# Patient Record
Sex: MALE
[De-identification: names, ages, dates, MRNs, and addresses within clinical notes are randomized per-mention and may not be internally consistent; named-entity substitution may affect disease eponyms.]

---

## 2017-07-12 ENCOUNTER — CHARTING TRANS (OUTPATIENT)
Dept: OTHER | Age: 37
End: 2017-07-12

## 2017-07-12 ASSESSMENT — PAIN SCALES - GENERAL: PAINLEVEL_OUTOF10: 0

## 2017-08-22 ENCOUNTER — BH HISTORICAL (OUTPATIENT)
Dept: OTHER | Age: 37
End: 2017-08-22

## 2017-09-26 ENCOUNTER — BH HISTORICAL (OUTPATIENT)
Dept: OTHER | Age: 37
End: 2017-09-26

## 2017-11-09 ENCOUNTER — BH HISTORICAL (OUTPATIENT)
Dept: OTHER | Age: 37
End: 2017-11-09

## 2017-12-18 ENCOUNTER — LAB SERVICES (OUTPATIENT)
Dept: OTHER | Age: 37
End: 2017-12-18

## 2017-12-19 ENCOUNTER — CHARTING TRANS (OUTPATIENT)
Dept: OTHER | Age: 37
End: 2017-12-19

## 2017-12-19 LAB
ALBUMIN SERPL-MCNC: 4.2 G/DL (ref 3.6–5.1)
ALBUMIN/GLOB SERPL: 1.4 (ref 1–2.4)
ALP SERPL-CCNC: 72 UNITS/L (ref 45–117)
ALT SERPL-CCNC: 35 UNITS/L
ANION GAP SERPL CALC-SCNC: 14 MMOL/L (ref 10–20)
AST SERPL-CCNC: 22 UNITS/L
BILIRUB SERPL-MCNC: 0.5 MG/DL (ref 0.2–1)
BUN SERPL-MCNC: 19 MG/DL (ref 6–20)
BUN/CREAT SERPL: 16 (ref 7–25)
CALCIUM SERPL-MCNC: 9.1 MG/DL (ref 8.4–10.2)
CHLORIDE SERPL-SCNC: 99 MMOL/L (ref 98–107)
CHOLEST SERPL-MCNC: 154 MG/DL
CHOLEST/HDLC SERPL: 2.7
CO2 SERPL-SCNC: 30 MMOL/L (ref 21–32)
CREAT SERPL-MCNC: 1.17 MG/DL (ref 0.67–1.17)
GLOBULIN SER-MCNC: 3.1 G/DL (ref 2–4)
GLUCOSE SERPL-MCNC: 86 MG/DL (ref 65–99)
HDLC SERPL-MCNC: 57 MG/DL
LDLC SERPL CALC-MCNC: 75 MG/DL
LENGTH OF FAST TIME PATIENT: NORMAL HRS
LENGTH OF FAST TIME PATIENT: NORMAL HRS
NONHDLC SERPL-MCNC: 97 MG/DL
POTASSIUM SERPL-SCNC: 4.2 MMOL/L (ref 3.4–5.1)
SODIUM SERPL-SCNC: 139 MMOL/L (ref 135–145)
TOTAL PROTEIN: 7.3 G/DL (ref 6.4–8.2)
TRIGL SERPL-MCNC: 110 MG/DL

## 2018-02-26 ENCOUNTER — BH HISTORICAL (OUTPATIENT)
Dept: OTHER | Age: 38
End: 2018-02-26

## 2018-06-06 ENCOUNTER — TELEPHONE (OUTPATIENT)
Dept: INTERNAL MEDICINE CLINIC | Facility: CLINIC | Age: 38
End: 2018-06-06

## 2018-06-06 ENCOUNTER — HOSPITAL ENCOUNTER (OUTPATIENT)
Dept: MRI IMAGING | Facility: HOSPITAL | Age: 38
Discharge: HOME | End: 2018-06-06
Attending: ORTHOPAEDIC SURGERY
Payer: COMMERCIAL

## 2018-06-06 DIAGNOSIS — S46.222A: ICD-10-CM

## 2018-06-06 DIAGNOSIS — S46.211A RUPTURE OF RIGHT BICEPS TENDON, INITIAL ENCOUNTER: Primary | ICD-10-CM

## 2018-06-06 PROCEDURE — 73221 MRI JOINT UPR EXTREM W/O DYE: CPT | Performed by: ORTHOPAEDIC SURGERY

## 2018-06-06 NOTE — TELEPHONE ENCOUNTER
Called patient and relayed DR. NGUYEN message - verbalized understanding . Transferred to [facility] to schedule nancy.  Tomorrow to establish care and pre op visit

## 2018-06-06 NOTE — TELEPHONE ENCOUNTER
To nursing, please tell patient HMO referral entered for Dr. Richelle John. Pt needs to make an appt for a visit to establish care here--and see someone here as pre-op tomorrow. I am not in the office tomorrow (Thursday) but he can see an associate.    I ha

## 2018-06-06 NOTE — TELEPHONE ENCOUNTER
Pt. Called stating he is having an emergent situation regarding a distal tendon rupture on his right arm. Dr. Beau Johnson at [facility] can do his surgery this Friday. The pt.  Has [facility] and will need a referral from Dr. Mago Holman

## 2018-06-07 ENCOUNTER — OFFICE VISIT (OUTPATIENT)
Dept: INTERNAL MEDICINE CLINIC | Facility: CLINIC | Age: 38
End: 2018-06-07

## 2018-06-07 ENCOUNTER — TELEPHONE (OUTPATIENT)
Dept: INTERNAL MEDICINE CLINIC | Facility: CLINIC | Age: 38
End: 2018-06-07

## 2018-06-07 VITALS
BODY MASS INDEX: 26.18 KG/M2 | WEIGHT: 204 LBS | DIASTOLIC BLOOD PRESSURE: 64 MMHG | TEMPERATURE: 100 F | OXYGEN SATURATION: 98 % | SYSTOLIC BLOOD PRESSURE: 126 MMHG | RESPIRATION RATE: 18 BRPM | HEART RATE: 88 BPM | HEIGHT: 74 IN

## 2018-06-07 DIAGNOSIS — K42.9 UMBILICAL HERNIA WITHOUT OBSTRUCTION AND WITHOUT GANGRENE: ICD-10-CM

## 2018-06-07 DIAGNOSIS — S46.211A RUPTURE OF RIGHT BICEPS TENDON, INITIAL ENCOUNTER: Primary | ICD-10-CM

## 2018-06-07 PROCEDURE — 99212 OFFICE O/P EST SF 10 MIN: CPT | Performed by: INTERNAL MEDICINE

## 2018-06-07 PROCEDURE — 24342 REPAIR OF RUPTURED TENDON: CPT | Performed by: INTERNAL MEDICINE

## 2018-06-07 PROCEDURE — 99204 OFFICE O/P NEW MOD 45 MIN: CPT | Performed by: INTERNAL MEDICINE

## 2018-06-07 NOTE — TELEPHONE ENCOUNTER
Viola Lux called back - she needs a referral for the surgery on 6/12. She has one for today's visit. Please call her if you need information - codes, procedures, etc. F: [phone].       To nursing

## 2018-06-07 NOTE — PROGRESS NOTES
Jamila Quintero is a 30year old malePatient presents with:  Pre-Op Exam: Patient present for laceration of right bicep muscle on Sunday. Pain 5/10 to right arm. Surgery 6/12/18. Hernia: Patient c/o umbilical hernia that has increased pain the past 2 days. shortness of breath, cough or wheezing  CARDIOVASCULAR: denies chest pain or pressure or palpitations  GI: denies abdominal pain, N/V, diarrhea, constipation, hematochezia or melena.  Reports umbilical hernia  : denies nocturia or changes in stream  NEURO

## 2018-06-07 NOTE — TELEPHONE ENCOUNTER
Please call Juliana Blizzard at Dr Yoli Mtz office  Pt is being seen today for first appt and pre-surgical appt, pt had insurance change and was seen at Dr Yoli Mtz office on 6/5/18  Needs referral for sugery which needs to happen as soon as possible, scheduling for Tu

## 2018-06-07 NOTE — TELEPHONE ENCOUNTER
Managed Care, Referral placed by Dr Anna Valero and casper QUINN done prior to surgery on Tuesday 6/12/18.

## 2018-06-07 NOTE — TELEPHONE ENCOUNTER
Left a VM for Elisha at Dr. Kathy Ma office informing her that DR. Rohan Waldron entered a ortho referral yesterday re: distal tendon rupture RT arm. (see encounter from 6/6/18). Does referral need to be faxed to Dr. Mohan Bhatt office? If so fax number needed.  Xavier

## 2018-06-08 ENCOUNTER — TELEPHONE (OUTPATIENT)
Dept: INTERNAL MEDICINE CLINIC | Facility: CLINIC | Age: 38
End: 2018-06-08

## 2018-06-08 NOTE — TELEPHONE ENCOUNTER
Patient will need a Elbow T Rom (code ) after surgery Tuesday. Need referral sent to [facility] care.     Dr. Gloria Trinidad office [phone]  Routed to clinical.

## 2018-06-08 NOTE — TELEPHONE ENCOUNTER
Please check with managed care–I believe the orthopedic doctor is to do his own referrals for equipment / procedures that he is recommending. Thanks.

## 2018-06-08 NOTE — TELEPHONE ENCOUNTER
I contacted [facility] to verify that surgery has been approved. Managed care is stating they have no record of surgery, Dr. Charly Franco needs to contact them.     I left message for Jose at Dr. Dex Dee office of the above information and asked that she call m

## 2018-06-08 NOTE — TELEPHONE ENCOUNTER
Jose from Dr. Merlin Foreman office called back. Surgery will be done at [facility], they will get authorization with [facility].

## 2018-06-14 PROBLEM — K42.9 UMBILICAL HERNIA WITHOUT OBSTRUCTION AND WITHOUT GANGRENE: Status: ACTIVE | Noted: 2018-06-14

## 2018-06-15 ENCOUNTER — TELEPHONE (OUTPATIENT)
Dept: INTERNAL MEDICINE CLINIC | Facility: CLINIC | Age: 38
End: 2018-06-15

## 2018-06-15 DIAGNOSIS — S46.211D RUPTURE OF RIGHT DISTAL BICEPS TENDON, SUBSEQUENT ENCOUNTER: Primary | ICD-10-CM

## 2018-06-15 NOTE — TELEPHONE ENCOUNTER
Jerica Sample called from Dr Ashleigh Carvalho office [phone]  UXR#[phone]  Waiting to hear if referral complete for pt brace that will be placed at his appt on Tuesday, 6/19/18  Will brace have to be provided by DME or will insurance cover brace ?   Discussed with

## 2018-06-15 NOTE — TELEPHONE ENCOUNTER
Dior Collado from [facility] will call Kristina Hernandez at Dr Duard Snellen office to discuss

## 2018-06-19 NOTE — TELEPHONE ENCOUNTER
To nursing, I signed DME order as requested. I wrote in comments--Elbow brace as recommended by his orthopedic surgeon. Thanks.      Rupture of right distal biceps tendon, subsequent encounter    - DME GENERIC NO CHARGE PRINTABLE  - DME - EXTERNAL

## 2018-06-26 ENCOUNTER — TELEPHONE (OUTPATIENT)
Dept: INTERNAL MEDICINE CLINIC | Facility: CLINIC | Age: 38
End: 2018-06-26

## 2018-06-26 NOTE — TELEPHONE ENCOUNTER
To Dr. Bethany Gonzalez - see below, pended above. TCB for more info, not sure what rehab is concerning, do you?

## 2018-06-26 NOTE — TELEPHONE ENCOUNTER
To nursing, asking for HMO referral for PT in regards to s/p repair of Right biceps tendon rupture. The PT order came from his ortho. I can only give the HMO referral if the managed care says I must b/c I am the PCP and ortho can't do it.    If I have to

## 2018-06-26 NOTE — TELEPHONE ENCOUNTER
Pt came in to office need referral to [facility] rehab. Fax# [phone] ph#[phone] pt have orders but need approval from ins.

## 2018-06-28 NOTE — TELEPHONE ENCOUNTER
Spoke with Katrin at [facility] who will discuss first thing tomorrow AM with Shima Rueda - they are very short-staffed but Shima Rueda will be back tomorrow. This was relayed to pt who verbalized understanding.   Pt will call Michelle Salazar to see what they

## 2018-06-28 NOTE — TELEPHONE ENCOUNTER
Patient was told by his ortho doctor (Dr. Angelica Blount) to do the physical therapy at [facility] in [address].    Patient set up an appointment at [facility] for tomorrow at 3:30 & needs his referral changed. He claims he was told that [facility] is in his network.

## 2018-06-29 NOTE — TELEPHONE ENCOUNTER
LMTCB with Sammie Steward   - please Veatrice Child could you call our office - patient scheduled nancy for today with Kevin and needs referral changed -thank you also routed to [facility]

## 2018-06-29 NOTE — TELEPHONE ENCOUNTER
After reviewing the referral, Dr. Diane Interiano (Orthopedics) ordered and got approval for PT. Therefore, they are responsible to make chances to the referral and it appears it been update to [facility].       can't make chances or fax the referral.     Thank you,

## 2018-06-29 NOTE — TELEPHONE ENCOUNTER
Called managed care , spoke with Leeann Lee who states referral was updated and she will fax it to [facility]

## 2018-07-02 NOTE — TELEPHONE ENCOUNTER
Marsha Kay from [facility] called stating the pt. Has an appt there today and they still were having referral issues. I spoke with Jose Varghese in Managed care and was told that Dr. Edith Salamanca, the pt's ortho is responsible to generate/update the referral for the pt.   No

## 2018-07-12 PROBLEM — D17.1 LIPOMA OF CHEST WALL: Status: ACTIVE | Noted: 2018-07-12

## 2018-07-12 PROBLEM — K42.9 UMBILICAL HERNIA WITHOUT OBSTRUCTION AND WITHOUT GANGRENE: Status: RESOLVED | Noted: 2018-06-14 | Resolved: 2018-07-12

## 2018-08-17 ENCOUNTER — LAB REQUISITION (OUTPATIENT)
Dept: LAB | Facility: HOSPITAL | Age: 38
End: 2018-08-17
Payer: COMMERCIAL

## 2018-08-17 DIAGNOSIS — R22.2 LOCALIZED SWELLING, MASS AND LUMP, TRUNK: ICD-10-CM

## 2018-08-17 PROCEDURE — 88304 TISSUE EXAM BY PATHOLOGIST: CPT | Performed by: SURGERY

## 2018-10-29 ENCOUNTER — TELEPHONE (OUTPATIENT)
Dept: INTERNAL MEDICINE CLINIC | Facility: CLINIC | Age: 38
End: 2018-10-29

## 2018-10-29 DIAGNOSIS — A63.0 GENITAL WARTS: Primary | ICD-10-CM

## 2018-10-29 NOTE — TELEPHONE ENCOUNTER
Pt. States he requested a referral last week for:dermatology at [facility] pt. Is seeing a NP. Pt. Has an appt.  10/31 at 2pm please advise  Ph. # [phone]    Routed high to clinical

## 2018-10-29 NOTE — TELEPHONE ENCOUNTER
I called patient to clarify what dermatologist he is seeing and what he is seeing dermatology for. Patient is scheduled to see Kenneth QUINN at [address] [place] [address].  I spoke with patient and he says he saw Dr. Brittany Grier for genital warts and this is why he is

## 2018-11-03 VITALS
TEMPERATURE: 97.9 F | RESPIRATION RATE: 16 BRPM | SYSTOLIC BLOOD PRESSURE: 120 MMHG | BODY MASS INDEX: 27.82 KG/M2 | WEIGHT: 209.88 LBS | OXYGEN SATURATION: 98 % | HEIGHT: 73 IN | DIASTOLIC BLOOD PRESSURE: 82 MMHG | HEART RATE: 72 BPM

## 2019-10-24 ENCOUNTER — ANESTHESIA (OUTPATIENT)
Dept: SURGERY | Facility: HOSPITAL | Age: 39
End: 2019-10-24
Payer: COMMERCIAL

## 2019-10-24 ENCOUNTER — HOSPITAL ENCOUNTER (OUTPATIENT)
Facility: HOSPITAL | Age: 39
Setting detail: OBSERVATION
Discharge: HOME | End: 2019-10-24
Attending: EMERGENCY MEDICINE | Admitting: HOSPITALIST
Payer: COMMERCIAL

## 2019-10-24 ENCOUNTER — ANESTHESIA EVENT (OUTPATIENT)
Dept: SURGERY | Facility: HOSPITAL | Age: 39
End: 2019-10-24
Payer: COMMERCIAL

## 2019-10-24 ENCOUNTER — APPOINTMENT (OUTPATIENT)
Dept: CT IMAGING | Facility: HOSPITAL | Age: 39
End: 2019-10-24
Attending: EMERGENCY MEDICINE
Payer: COMMERCIAL

## 2019-10-24 ENCOUNTER — APPOINTMENT (OUTPATIENT)
Dept: ULTRASOUND IMAGING | Facility: HOSPITAL | Age: 39
End: 2019-10-24
Attending: EMERGENCY MEDICINE
Payer: COMMERCIAL

## 2019-10-24 VITALS
OXYGEN SATURATION: 97 % | TEMPERATURE: 99 F | BODY MASS INDEX: 27.14 KG/M2 | RESPIRATION RATE: 18 BRPM | SYSTOLIC BLOOD PRESSURE: 125 MMHG | HEART RATE: 71 BPM | HEIGHT: 74 IN | WEIGHT: 211.5 LBS | DIASTOLIC BLOOD PRESSURE: 52 MMHG

## 2019-10-24 DIAGNOSIS — K35.890 OTHER ACUTE APPENDICITIS: Primary | ICD-10-CM

## 2019-10-24 DIAGNOSIS — K35.80 ACUTE APPENDICITIS: ICD-10-CM

## 2019-10-24 PROCEDURE — 76705 ECHO EXAM OF ABDOMEN: CPT | Performed by: EMERGENCY MEDICINE

## 2019-10-24 PROCEDURE — 74177 CT ABD & PELVIS W/CONTRAST: CPT | Performed by: EMERGENCY MEDICINE

## 2019-10-24 PROCEDURE — 99219 INITIAL OBSERVATION CARE,LEVL II: CPT | Performed by: HOSPITALIST

## 2019-10-24 PROCEDURE — 0DTJ4ZZ RESECTION OF APPENDIX, PERCUTANEOUS ENDOSCOPIC APPROACH: ICD-10-PCS | Performed by: SURGERY

## 2019-10-24 RX ORDER — NALOXONE HYDROCHLORIDE 0.4 MG/ML
80 INJECTION, SOLUTION INTRAMUSCULAR; INTRAVENOUS; SUBCUTANEOUS AS NEEDED
Status: DISCONTINUED | OUTPATIENT
Start: 2019-10-24 | End: 2019-10-24

## 2019-10-24 RX ORDER — ONDANSETRON 2 MG/ML
4 INJECTION INTRAMUSCULAR; INTRAVENOUS ONCE AS NEEDED
Status: DISCONTINUED | OUTPATIENT
Start: 2019-10-24 | End: 2019-10-24

## 2019-10-24 RX ORDER — HYDROMORPHONE HYDROCHLORIDE 1 MG/ML
0.2 INJECTION, SOLUTION INTRAMUSCULAR; INTRAVENOUS; SUBCUTANEOUS EVERY 5 MIN PRN
Status: DISCONTINUED | OUTPATIENT
Start: 2019-10-24 | End: 2019-10-24

## 2019-10-24 RX ORDER — MEPERIDINE HYDROCHLORIDE 50 MG/ML
25 INJECTION INTRAMUSCULAR; INTRAVENOUS; SUBCUTANEOUS ONCE
Status: COMPLETED | OUTPATIENT
Start: 2019-10-24 | End: 2019-10-24

## 2019-10-24 RX ORDER — ONDANSETRON 2 MG/ML
4 INJECTION INTRAMUSCULAR; INTRAVENOUS ONCE AS NEEDED
Status: COMPLETED | OUTPATIENT
Start: 2019-10-24 | End: 2019-10-24

## 2019-10-24 RX ORDER — DEXAMETHASONE SODIUM PHOSPHATE 4 MG/ML
VIAL (ML) INJECTION AS NEEDED
Status: DISCONTINUED | OUTPATIENT
Start: 2019-10-24 | End: 2019-10-24 | Stop reason: SURG

## 2019-10-24 RX ORDER — HYDROCODONE BITARTRATE AND ACETAMINOPHEN 5; 325 MG/1; MG/1
1 TABLET ORAL AS NEEDED
Status: DISCONTINUED | OUTPATIENT
Start: 2019-10-24 | End: 2019-10-24

## 2019-10-24 RX ORDER — BUPIVACAINE HYDROCHLORIDE 2.5 MG/ML
INJECTION, SOLUTION EPIDURAL; INFILTRATION; INTRACAUDAL AS NEEDED
Status: DISCONTINUED | OUTPATIENT
Start: 2019-10-24 | End: 2019-10-24

## 2019-10-24 RX ORDER — MORPHINE SULFATE 4 MG/ML
2 INJECTION, SOLUTION INTRAMUSCULAR; INTRAVENOUS EVERY 10 MIN PRN
Status: DISCONTINUED | OUTPATIENT
Start: 2019-10-24 | End: 2019-10-24

## 2019-10-24 RX ORDER — PROCHLORPERAZINE EDISYLATE 5 MG/ML
5 INJECTION INTRAMUSCULAR; INTRAVENOUS ONCE AS NEEDED
Status: DISCONTINUED | OUTPATIENT
Start: 2019-10-24 | End: 2019-10-24

## 2019-10-24 RX ORDER — DEXTROSE, SODIUM CHLORIDE, AND POTASSIUM CHLORIDE 5; .9; .15 G/100ML; G/100ML; G/100ML
INJECTION INTRAVENOUS CONTINUOUS
Status: DISCONTINUED | OUTPATIENT
Start: 2019-10-24 | End: 2019-10-24

## 2019-10-24 RX ORDER — HYDROMORPHONE HYDROCHLORIDE 1 MG/ML
0.3 INJECTION, SOLUTION INTRAMUSCULAR; INTRAVENOUS; SUBCUTANEOUS
Status: DISCONTINUED | OUTPATIENT
Start: 2019-10-24 | End: 2019-10-24

## 2019-10-24 RX ORDER — HYDROCODONE BITARTRATE AND ACETAMINOPHEN 5; 325 MG/1; MG/1
2 TABLET ORAL EVERY 4 HOURS PRN
Status: DISCONTINUED | OUTPATIENT
Start: 2019-10-24 | End: 2019-10-24

## 2019-10-24 RX ORDER — HYDROMORPHONE HYDROCHLORIDE 1 MG/ML
0.8 INJECTION, SOLUTION INTRAMUSCULAR; INTRAVENOUS; SUBCUTANEOUS
Status: DISCONTINUED | OUTPATIENT
Start: 2019-10-24 | End: 2019-10-24

## 2019-10-24 RX ORDER — HYDROCODONE BITARTRATE AND ACETAMINOPHEN 5; 325 MG/1; MG/1
2 TABLET ORAL AS NEEDED
Status: DISCONTINUED | OUTPATIENT
Start: 2019-10-24 | End: 2019-10-24

## 2019-10-24 RX ORDER — ROCURONIUM BROMIDE 10 MG/ML
INJECTION, SOLUTION INTRAVENOUS AS NEEDED
Status: DISCONTINUED | OUTPATIENT
Start: 2019-10-24 | End: 2019-10-24 | Stop reason: SURG

## 2019-10-24 RX ORDER — SODIUM CHLORIDE, SODIUM LACTATE, POTASSIUM CHLORIDE, CALCIUM CHLORIDE 600; 310; 30; 20 MG/100ML; MG/100ML; MG/100ML; MG/100ML
INJECTION, SOLUTION INTRAVENOUS CONTINUOUS
Status: DISCONTINUED | OUTPATIENT
Start: 2019-10-24 | End: 2019-10-24

## 2019-10-24 RX ORDER — LIDOCAINE HYDROCHLORIDE 10 MG/ML
INJECTION, SOLUTION EPIDURAL; INFILTRATION; INTRACAUDAL; PERINEURAL AS NEEDED
Status: DISCONTINUED | OUTPATIENT
Start: 2019-10-24 | End: 2019-10-24 | Stop reason: SURG

## 2019-10-24 RX ORDER — AMOXICILLIN AND CLAVULANATE POTASSIUM 875; 125 MG/1; MG/1
1 TABLET, FILM COATED ORAL 2 TIMES DAILY
Qty: 6 TABLET | Refills: 0 | Status: SHIPPED | OUTPATIENT
Start: 2019-10-24 | End: 2019-10-27

## 2019-10-24 RX ORDER — HYDROCODONE BITARTRATE AND ACETAMINOPHEN 5; 325 MG/1; MG/1
1 TABLET ORAL EVERY 4 HOURS PRN
Status: DISCONTINUED | OUTPATIENT
Start: 2019-10-24 | End: 2019-10-24

## 2019-10-24 RX ORDER — MORPHINE SULFATE 10 MG/ML
6 INJECTION, SOLUTION INTRAMUSCULAR; INTRAVENOUS EVERY 10 MIN PRN
Status: DISCONTINUED | OUTPATIENT
Start: 2019-10-24 | End: 2019-10-24

## 2019-10-24 RX ORDER — ONDANSETRON 2 MG/ML
4 INJECTION INTRAMUSCULAR; INTRAVENOUS EVERY 6 HOURS PRN
Status: DISCONTINUED | OUTPATIENT
Start: 2019-10-24 | End: 2019-10-24

## 2019-10-24 RX ORDER — MORPHINE SULFATE 4 MG/ML
4 INJECTION, SOLUTION INTRAMUSCULAR; INTRAVENOUS EVERY 10 MIN PRN
Status: DISCONTINUED | OUTPATIENT
Start: 2019-10-24 | End: 2019-10-24

## 2019-10-24 RX ORDER — HYDROMORPHONE HYDROCHLORIDE 1 MG/ML
0.4 INJECTION, SOLUTION INTRAMUSCULAR; INTRAVENOUS; SUBCUTANEOUS EVERY 5 MIN PRN
Status: DISCONTINUED | OUTPATIENT
Start: 2019-10-24 | End: 2019-10-24

## 2019-10-24 RX ORDER — SODIUM CHLORIDE 0.9 % (FLUSH) 0.9 %
3 SYRINGE (ML) INJECTION AS NEEDED
Status: DISCONTINUED | OUTPATIENT
Start: 2019-10-24 | End: 2019-10-24

## 2019-10-24 RX ORDER — GLYCOPYRROLATE 0.2 MG/ML
INJECTION INTRAMUSCULAR; INTRAVENOUS AS NEEDED
Status: DISCONTINUED | OUTPATIENT
Start: 2019-10-24 | End: 2019-10-24 | Stop reason: SURG

## 2019-10-24 RX ORDER — NEOSTIGMINE METHYLSULFATE 0.5 MG/ML
INJECTION INTRAVENOUS AS NEEDED
Status: DISCONTINUED | OUTPATIENT
Start: 2019-10-24 | End: 2019-10-24 | Stop reason: SURG

## 2019-10-24 RX ORDER — POTASSIUM CHLORIDE 20 MEQ/1
40 TABLET, EXTENDED RELEASE ORAL ONCE
Status: COMPLETED | OUTPATIENT
Start: 2019-10-24 | End: 2019-10-24

## 2019-10-24 RX ORDER — MIDAZOLAM HYDROCHLORIDE 1 MG/ML
INJECTION INTRAMUSCULAR; INTRAVENOUS AS NEEDED
Status: DISCONTINUED | OUTPATIENT
Start: 2019-10-24 | End: 2019-10-24 | Stop reason: SURG

## 2019-10-24 RX ORDER — SODIUM CHLORIDE, SODIUM LACTATE, POTASSIUM CHLORIDE, CALCIUM CHLORIDE 600; 310; 30; 20 MG/100ML; MG/100ML; MG/100ML; MG/100ML
INJECTION, SOLUTION INTRAVENOUS CONTINUOUS PRN
Status: DISCONTINUED | OUTPATIENT
Start: 2019-10-24 | End: 2019-10-24 | Stop reason: SURG

## 2019-10-24 RX ORDER — HYDROMORPHONE HYDROCHLORIDE 1 MG/ML
0.6 INJECTION, SOLUTION INTRAMUSCULAR; INTRAVENOUS; SUBCUTANEOUS EVERY 5 MIN PRN
Status: DISCONTINUED | OUTPATIENT
Start: 2019-10-24 | End: 2019-10-24

## 2019-10-24 RX ORDER — HYDROMORPHONE HYDROCHLORIDE 1 MG/ML
0.4 INJECTION, SOLUTION INTRAMUSCULAR; INTRAVENOUS; SUBCUTANEOUS
Status: DISCONTINUED | OUTPATIENT
Start: 2019-10-24 | End: 2019-10-24

## 2019-10-24 RX ORDER — NALOXONE HYDROCHLORIDE 0.4 MG/ML
80 INJECTION, SOLUTION INTRAMUSCULAR; INTRAVENOUS; SUBCUTANEOUS AS NEEDED
Status: ACTIVE | OUTPATIENT
Start: 2019-10-24 | End: 2019-10-24

## 2019-10-24 RX ORDER — HYDROMORPHONE HYDROCHLORIDE 1 MG/ML
0.2 INJECTION, SOLUTION INTRAMUSCULAR; INTRAVENOUS; SUBCUTANEOUS ONCE
Status: COMPLETED | OUTPATIENT
Start: 2019-10-24 | End: 2019-10-24

## 2019-10-24 RX ORDER — ENOXAPARIN SODIUM 100 MG/ML
40 INJECTION SUBCUTANEOUS EVERY 24 HOURS
Status: DISCONTINUED | OUTPATIENT
Start: 2019-10-24 | End: 2019-10-24

## 2019-10-24 RX ORDER — HYDROCODONE BITARTRATE AND ACETAMINOPHEN 5; 325 MG/1; MG/1
1 TABLET ORAL EVERY 4 HOURS PRN
Qty: 20 TABLET | Refills: 1 | Status: SHIPPED | OUTPATIENT
Start: 2019-10-24 | End: 2019-10-29

## 2019-10-24 RX ADMIN — SODIUM CHLORIDE, SODIUM LACTATE, POTASSIUM CHLORIDE, CALCIUM CHLORIDE: 600; 310; 30; 20 INJECTION, SOLUTION INTRAVENOUS at 06:10:00

## 2019-10-24 RX ADMIN — ONDANSETRON 4 MG: 2 INJECTION INTRAMUSCULAR; INTRAVENOUS at 06:14:00

## 2019-10-24 RX ADMIN — GLYCOPYRROLATE 0.2 MG: 0.2 INJECTION INTRAMUSCULAR; INTRAVENOUS at 06:14:00

## 2019-10-24 RX ADMIN — DEXAMETHASONE SODIUM PHOSPHATE 4 MG: 4 MG/ML VIAL (ML) INJECTION at 06:14:00

## 2019-10-24 RX ADMIN — MIDAZOLAM HYDROCHLORIDE 2 MG: 1 INJECTION INTRAMUSCULAR; INTRAVENOUS at 06:10:00

## 2019-10-24 RX ADMIN — GLYCOPYRROLATE 0.8 MG: 0.2 INJECTION INTRAMUSCULAR; INTRAVENOUS at 06:53:00

## 2019-10-24 RX ADMIN — ROCURONIUM BROMIDE 40 MG: 10 INJECTION, SOLUTION INTRAVENOUS at 06:14:00

## 2019-10-24 RX ADMIN — LIDOCAINE HYDROCHLORIDE 50 MG: 10 INJECTION, SOLUTION EPIDURAL; INFILTRATION; INTRACAUDAL; PERINEURAL at 06:14:00

## 2019-10-24 RX ADMIN — NEOSTIGMINE METHYLSULFATE 5 MG: 0.5 INJECTION INTRAVENOUS at 06:53:00

## 2019-10-24 NOTE — PLAN OF CARE
Problem: Patient Centered Care  Goal: Patient preferences are identified and integrated in the patient's plan of care  Description  Interventions:  - What would you like us to know as we care for you?  Owns own business, has busy weekend planned  - Provid

## 2019-10-24 NOTE — ED PROVIDER NOTES
Patient Seen in: [facility] AND [facility] Emergency Department      History   Patient presents with:  Abdomen/Flank Pain (GI/)    Stated Complaint: abd pain     HPI    Pt is 41 yo M who p/w right sided abdominal pain that started last night while at gym.  [address] 5/5 motor strength in all extremities, no focal deficits  SKIN: warm, dry, no rashes        ED Course     Labs Reviewed   URINALYSIS WITH CULTURE REFLEX - Normal   BASIC METABOLIC PANEL (8) - Normal   HEPATIC FUNCTION PANEL (7) - Normal   LIPASE - Normal ICD-10-CM Noted POA    Appendicitis, acute K35.80 10/24/2019 Unknown

## 2019-10-24 NOTE — PLAN OF CARE
Discharge instructions reviewed with pt, using teach back technique, pt states verbal understanding  Discussed with pt importance of not driving while taking norco, picking up augmentin from pharmacy and taking as ordered.    Discharged  Ambulatory in appar

## 2019-10-24 NOTE — H&P
[facility]    PATIENT'S NAME: Carline Perez PHYSICIAN: Deneen Angulo MD   PATIENT ACCOUNT#:   [id number]    LOCATION:  [place] [address] #:   [id number]       YOB: 1980  ADMISSION DATE:       10/24 as about a 9/10. He denied any nausea. He denies any travel history. No urinary symptoms. No hematuria. He has had no diarrhea or constipation.     PAST MEDICAL HISTORY:  Significant for an epigastric ventral hernia repair with mesh and right chest lip the emergency room. Stool was dark and trace Hemoccult positive. EXTREMITIES:  No clubbing, cyanosis, calf tenderness, palpable cords, or edema. Pedal pulses were palpated bilaterally. SKIN:  Warm and dry without any significant rashes.   NEUROLOGIC:  T

## 2019-10-24 NOTE — ANESTHESIA PREPROCEDURE EVALUATION
Anesthesia PreOp Note    HPI:     Sergo Landaverde is a 42year old male who presents for preoperative consultation requested by: Jenna Maciel MD    Date of Surgery: 10/24/2019    Procedure(s):  LAPAROSCOPIC APPENDECTOMY  Indication: Acute appendicitis • Cancer Maternal Grandmother    • Cancer Paternal Grandmother      Social History    Socioeconomic History      Marital status: Single      Spouse name: Not on file      Number of children: Not on file      Years of education: Not on file      Highest e GLU 89 10/24/2019    CA 8.5 10/24/2019          Vital Signs: Body mass index is 27.15 kg/m². height is 1.88 m (6' 2\") and weight is 95.9 kg (211 lb 8 oz). His oral temperature is 98.3 °F (36.8 °C). His blood pressure is 120/77 and his pulse is 65.  His

## 2019-10-24 NOTE — OPERATIVE REPORT
OPERATIVE REPORT:     PATIENT NAME: Cristo Toney  : 1980   CSN: [id number]    DATE OF OPERATION:   10/24/19    PREOPERATIVE DIAGNOSIS: Acute appendicitis    POSTOPERATIVE DIAGNOSIS: Acute appendicitis     PROCEDURE PERFORMED: Laparoscopic appendec Vicryl sutures. Local anesthetic was infiltrated at the fascial level the wounds were irrigated and closed using 4-0 Vicryl subicular sutures. The incisions were dressed with Dermabond.     The patient tolerated procedure well and went to recovery in stab

## 2019-10-24 NOTE — CONSULTS
Vinny 1808  RRT:[birth year]  IYY:[id number]  LOS:0    Date of Admission:  10/24/2019  Date of Consult:  10/24/2019 Systems:   Pertinent items are noted in HPI.   Constitutional: positive for fevers  Eyes: negative  Ears, nose, mouth, throat, and face: negative  Respiratory: negative  Cardiovascular: negative  Gastrointestinal: positive for abdominal pain  Genitourinary: appendicitis    Acute appendicitis    The patient has acute appendicitis on clinical grounds and confirmed by CT scan (I reviewed all images). There is evidence of acute appendicitis and localized peritonitis with periappendiceal inflammatory changes.     I

## 2019-10-24 NOTE — PROGRESS NOTES
ADMISSION NOTE    42year old male presents with R abdominal pain CT postiive for acute appendicitis. . Available medical records partially reviewed. Dictation to follow.     Tyler Blanchard M.D.  10/24/2019

## 2019-10-24 NOTE — ANESTHESIA PROCEDURE NOTES
Airway  Urgency: elective      General Information and Staff    Patient location during procedure: OR  Anesthesiologist: Bethel Hines MD  Performed: anesthesiologist     Indications and Patient Condition  Indications for airway management: anesthesia

## 2019-10-24 NOTE — ED NOTES
Erasmo Severe arrives today from home with mother for RLQ and midabdominal pain since last night. No n/v/d. Pt reports black colored stool yesterday. No fevers, no dysuria. PMH of abdominal surgery for hernia with mesh placement.  Pt states he is physically fit and

## 2019-10-24 NOTE — ANESTHESIA POSTPROCEDURE EVALUATION
Patient: Karena Cheema    Procedure Summary     Date:  10/24/19 Room / Location:  [address] [place] [address] MAIN OR 06 / [address] [place] [address] MAIN OR    Anesthesia Start:  6645 Anesthesia Stop:  9931    Procedure:  LAPAROSCOPIC APPENDECTOMY (N/A Abdomen) Diagnosis:       Acute appendicitis

## 2019-10-25 NOTE — DISCHARGE SUMMARY
[facility]    Discharge Summary    Valentino Fernandez Patient Status:  Observation    1980 MRN [id number]   Location [address] [facility] Attending No att. providers found   Hosp Day # 0 PCP PHYSICIAN NONSTAFF     Date of Admission: 10/ Because of the persistence of pain, he presented to the emergency room where he was found to have a normal white count of 9.7 and a normal urinalysis. A gallbladder ultrasound was performed, which revealed a contracted gallbladder, but no cholelithiasis. Amoxicillin-Pot Clavulanate 875-125 MG Tabs  Commonly known as:  AUGMENTIN      Take 1 tablet by mouth 2 (two) times daily for 3 days.    Stop taking on:  October 27, 2019  Quantity:  6 tablet  Refills:  0     HYDROcodone-acetaminophen 5-325 MG Tabs  Comm severe pain. Take it with food and discontinue if side effects occur. No alcohol or driving while taking prescription pain medications. Norco contains Tylenol (acetaminophen).  Do not take Tylenol (acetaminophen) containing products if you take Norco for amber

## 2020-03-13 ENCOUNTER — HOSPITAL ENCOUNTER (EMERGENCY)
Facility: HOSPITAL | Age: 40
Discharge: HOME | End: 2020-03-13
Attending: PHYSICIAN ASSISTANT
Payer: COMMERCIAL

## 2020-03-13 ENCOUNTER — APPOINTMENT (OUTPATIENT)
Dept: GENERAL RADIOLOGY | Facility: HOSPITAL | Age: 40
End: 2020-03-13
Attending: PHYSICIAN ASSISTANT
Payer: COMMERCIAL

## 2020-03-13 VITALS
OXYGEN SATURATION: 98 % | SYSTOLIC BLOOD PRESSURE: 118 MMHG | RESPIRATION RATE: 18 BRPM | BODY MASS INDEX: 26.31 KG/M2 | WEIGHT: 205 LBS | DIASTOLIC BLOOD PRESSURE: 73 MMHG | TEMPERATURE: 101 F | HEIGHT: 74 IN | HEART RATE: 90 BPM

## 2020-03-13 DIAGNOSIS — J11.1 INFLUENZA-LIKE ILLNESS: Primary | ICD-10-CM

## 2020-03-13 LAB — S PYO AG THROAT QL: NEGATIVE

## 2020-03-13 PROCEDURE — 99283 EMERGENCY DEPT VISIT LOW MDM: CPT

## 2020-03-13 PROCEDURE — 71046 X-RAY EXAM CHEST 2 VIEWS: CPT | Performed by: PHYSICIAN ASSISTANT

## 2020-03-13 PROCEDURE — 87430 STREP A AG IA: CPT

## 2020-03-13 RX ORDER — IBUPROFEN 600 MG/1
TABLET ORAL
Qty: 20 TABLET | Refills: 0 | Status: SHIPPED | OUTPATIENT
Start: 2020-03-13 | End: 2020-07-20 | Stop reason: ALTCHOICE

## 2020-03-13 NOTE — ED PROVIDER NOTES
Patient Seen in: [facility] AND [facility] Emergency Department    History   Patient presents with:  Fever  Cough/URI  Sore Throat    Stated Complaint:     HPI    42-year-old male presents with chief complaint of sore throat. Onset 3 days ago.   Patient states h 90   Resp 03/13/20 1701 18   Temp 03/13/20 1701 (!) 100.5 °F (38.1 °C)   Temp src 03/13/20 1701 Temporal   SpO2 03/13/20 1701 98 %   O2 Device 03/13/20 1719 None (Room air)       Current:/73   Pulse 90   Temp (!) 100.5 °F (38.1 °C) (Temporal)   Resp that he does not meet current [place] Department of Health criteria for novel coronavirus testing. Physical exam remained stable over serial reexaminations as previously documented. Results reviewed and need for follow-up discussed with patient.     Disp

## 2020-07-20 PROBLEM — F32.5 MAJOR DEPRESSIVE DISORDER IN FULL REMISSION (HCC): Status: ACTIVE | Noted: 2020-07-20

## 2020-07-20 PROBLEM — K35.80 APPENDICITIS, ACUTE: Status: RESOLVED | Noted: 2019-10-24 | Resolved: 2020-07-20

## 2020-07-20 PROBLEM — K35.890 OTHER ACUTE APPENDICITIS: Status: RESOLVED | Noted: 2019-10-24 | Resolved: 2020-07-20

## 2020-07-20 NOTE — PROGRESS NOTES
Lena Velásquez is a 47year old male. Patient presents with:  Establish Care  Depression: needs refill on prozac  Referral: spots on skin    HPI:   42-year-old gentleman with past medical history of depression in full remission, skin lesions here for establ Maternal Grandmother    • Cancer Paternal Grandmother       No Known Allergies     REVIEW OF SYSTEMS:     Review of Systems   Constitutional: Negative for appetite change, fever and unexpected weight change.    HENT: Negative for congestion, ear pain, noseb rales.   Abdominal: Soft. Bowel sounds are normal. There is no tenderness. Neurological: He is alert and oriented to person, place, and time. No cranial nerve deficit, sensory deficit or motor deficit. Coordination normal.   Skin: Skin is warm and dry.

## 2020-07-21 ENCOUNTER — TELEPHONE (OUTPATIENT)
Dept: INTERNAL MEDICINE CLINIC | Facility: CLINIC | Age: 40
End: 2020-07-21

## 2020-07-21 NOTE — TELEPHONE ENCOUNTER
Spoke with Martinez from [employer], pt  verified. She will call [facility] to get fluoxetine transfer to them (Nereyda Record) .

## 2021-01-20 RX ORDER — FLUOXETINE HYDROCHLORIDE 20 MG/1
CAPSULE ORAL
Qty: 90 CAPSULE | Refills: 0 | Status: SHIPPED | OUTPATIENT
Start: 2021-01-20 | End: 2021-04-26

## 2021-04-26 RX ORDER — FLUOXETINE HYDROCHLORIDE 20 MG/1
CAPSULE ORAL
Qty: 90 CAPSULE | Refills: 0 | Status: SHIPPED | OUTPATIENT
Start: 2021-04-26 | End: 2021-07-28

## 2021-07-28 RX ORDER — FLUOXETINE HYDROCHLORIDE 20 MG/1
CAPSULE ORAL
Qty: 90 CAPSULE | Refills: 0 | Status: SHIPPED | OUTPATIENT
Start: 2021-07-28 | End: 2021-11-01

## 2021-07-29 NOTE — TELEPHONE ENCOUNTER
I have approved 90-day supply. Last seen in June 2020.   He needs an appointment to see me in 3 months

## 2021-08-03 NOTE — TELEPHONE ENCOUNTER
Patient requesting for medication to be transferred to [facility]. Pharmacy updated on chart. [facility] no longer takes his insurance.      FLUoxetine HCl 20 MG Oral Cap - - -

## 2021-11-01 RX ORDER — FLUOXETINE HYDROCHLORIDE 20 MG/1
CAPSULE ORAL
Qty: 90 CAPSULE | Refills: 0 | Status: SHIPPED | OUTPATIENT
Start: 2021-11-01

## 2022-02-14 RX ORDER — FLUOXETINE HYDROCHLORIDE 20 MG/1
20 CAPSULE ORAL DAILY
Qty: 90 CAPSULE | Refills: 0 | Status: SHIPPED | OUTPATIENT
Start: 2022-02-14

## 2022-02-14 NOTE — TELEPHONE ENCOUNTER
Protocol failed or has No Protocol, please review  90 day refill given on 02/14/22, appointment needed for further refills.     Requested Prescriptions   Pending Prescriptions Disp Refills    FLUOXETINE 20 MG Oral Cap [Pharmacy Med Name: FLUOXETINE 20MG CAPSULES] 90 capsule 0     Sig: TAKE 1 CAPSULE BY MOUTH EVERY DAY        Psychiatric Non-Scheduled (Anti-Anxiety) Failed - 2/12/2022  3:12 PM        Failed - Appointment in last 6 or next 3 months              Recent Outpatient Visits              1 year ago Major depressive disorder in full remission, unspecified whether recurrent [facility])    [facility], Ml Jimenez MD    Office Visit    3 years ago [address] [facility]    Dermatology - [facility], Jayden Boogie PA-C    Office Visit    3 years ago     Surgery Visit Aguilar Calvert MD    Office Visit    3 years ago Umbilical hernia without obstruction and without gangrene    Surgery - [address], Kem Lemons MD    Office Visit    3 years ago     Surgery Visit Aguilar Calvert MD    Office Visit

## 2022-05-23 RX ORDER — FLUOXETINE HYDROCHLORIDE 20 MG/1
CAPSULE ORAL
Qty: 90 CAPSULE | Refills: 0 | OUTPATIENT
Start: 2022-05-23

## 2022-05-23 NOTE — TELEPHONE ENCOUNTER
Please see the previous messages. #1 he is out of network #2 I have not seen him for over 2 years  If he is following up with us, he needs to change his insurance and make an appointment. Unfortunately it is not appropriate for me to refill medication for patient that I have not seen for over 2 years.   My apologies

## 2022-05-23 NOTE — TELEPHONE ENCOUNTER
Please review; protocol failed/No Protcol    Requested Prescriptions   Pending Prescriptions Disp Refills    FLUOXETINE 20 MG Oral Cap [Pharmacy Med Name: FLUOXETINE 20MG CAPSULES] 90 capsule 0     Sig: TAKE 1 CAPSULE(20 MG) BY MOUTH DAILY        Psychiatric Non-Scheduled (Anti-Anxiety) Failed - 5/22/2022 11:17 AM        Failed - Appointment in last 6 or next 3 months              Recent Outpatient Visits              1 year ago Major depressive disorder in full remission, unspecified whether recurrent [facility])    Sylvain Bowman, Joanna Portillo MD    Office Visit    3 years ago [id number] [place] [address]    Dermatology - [facility], Shoshana Boogie PA-C    Office Visit    3 years ago     Surgery Visit Trixie Paez MD    Office Visit    3 years ago Umbilical hernia without obstruction and without gangrene    Surgery - Daniel Daly, Robert Love MD    Office Visit    3 years ago     Surgery Visit Trixie Paez MD    Office Visit

## 2022-05-23 NOTE — TELEPHONE ENCOUNTER
The patient was a no show several times. I called the pharmacy who stated the patient is asking for the medications and they cannot place anything in his chart to stop the asking. I left a detailed message on his personal answering machine that an appointment is needed and he has to be seen before any medications can be given. I also stated to contact his insurance company to make sure Dr. Binh Swenson is in his network.

## 2022-05-26 ENCOUNTER — TELEPHONE (OUTPATIENT)
Dept: INTERNAL MEDICINE CLINIC | Facility: CLINIC | Age: 42
End: 2022-05-26

## 2022-05-26 RX ORDER — FLUOXETINE HYDROCHLORIDE 20 MG/1
20 CAPSULE ORAL DAILY
Qty: 90 CAPSULE | Refills: 0 | Status: SHIPPED | OUTPATIENT
Start: 2022-05-26

## 2022-05-26 NOTE — TELEPHONE ENCOUNTER
Protocol failed or has No Protocol, please review  90 day refill given on 05/26/22, appointment needed for further refills.     Requested Prescriptions   Pending Prescriptions Disp Refills    FLUOXETINE 20 MG Oral Cap [Pharmacy Med Name: FLUOXETINE 20MG CAPSULES] 90 capsule 0     Sig: TAKE 1 CAPSULE(20 MG) BY MOUTH DAILY        Psychiatric Non-Scheduled (Anti-Anxiety) Failed - 5/26/2022  8:05 AM        Failed - Appointment in last 6 or next 3 months              Recent Outpatient Visits              1 year ago Major depressive disorder in full remission, unspecified whether recurrent [facility])    [facility], MD Kenneth    Office Visit    3 years ago [address] [facility]    Dermatology - [facility], Shoshana Boogie PA-C    Office Visit    3 years ago     Surgery Visit Darío Melendez MD    Office Visit    3 years ago Umbilical hernia without obstruction and without gangrene    Surgery - Migue lA Valadez, Charlie Shone, MD    Office Visit    3 years ago     Surgery Visit Darío Melendez MD    Office Visit

## 2022-05-26 NOTE — TELEPHONE ENCOUNTER
Protocol failed or has No Protocol, please review  90 day refill given on 05/26/22, appointment needed for further refills. Requested Prescriptions   Pending Prescriptions Disp Refills    FLUoxetine 20 MG Oral Cap [Pharmacy Med Name: FLUOXETINE 20MG CAPSULES] 90 capsule 0     Sig: Take 1 capsule (20 mg total) by mouth daily.         Psychiatric Non-Scheduled (Anti-Anxiety) Failed - 5/26/2022  6:08 PM        Failed - Appointment in last 6 or next 3 months              Recent Outpatient Visits              1 year ago Major depressive disorder in full remission, unspecified whether recurrent [facility])    [facility], [facility], [address] [place] [address] Rd[address], MD Kenneth    Office Visit    3 years ago [address] [place] [address]    Dermatology - Maria G Sánchez PA-C    Office Visit    3 years ago     Surgery Visit Aiden Sanchez MD    Office Visit    3 years ago Umbilical hernia without obstruction and without gangrene    Surgery - Susan Olvera, Ge Lopez MD    Office Visit    3 years ago     Surgery Visit Aiden Sanchez MD    Office Visit

## 2022-09-03 RX ORDER — FLUOXETINE HYDROCHLORIDE 20 MG/1
CAPSULE ORAL
Qty: 90 CAPSULE | Refills: 0 | OUTPATIENT
Start: 2022-09-03

## (undated) DEVICE — ENCORE® LATEX MICRO SIZE 8, STERILE LATEX POWDER-FREE SURGICAL GLOVE: Brand: ENCORE

## (undated) DEVICE — KENDALL SCD EXPRESS SLEEVES, KNEE LENGTH, MEDIUM: Brand: KENDALL SCD

## (undated) DEVICE — TROCAR: Brand: KII® SLEEVE

## (undated) DEVICE — [HIGH FLOW INSUFFLATOR,  DO NOT USE IF PACKAGE IS DAMAGED,  KEEP DRY,  KEEP AWAY FROM SUNLIGHT,  PROTECT FROM HEAT AND RADIOACTIVE SOURCES.]: Brand: PNEUMOSURE

## (undated) DEVICE — TISSUE RETRIEVAL SYSTEM: Brand: INZII RETRIEVAL SYSTEM

## (undated) DEVICE — SOL  .9 3000ML

## (undated) DEVICE — TROCAR: Brand: KII FIOS FIRST ENTRY

## (undated) DEVICE — ENDOPATH ETS-FLEX45 ARTICULATING ENDOSCOPIC LINEAR CUTTER, NO RELOAD: Brand: ENDOPATH

## (undated) DEVICE — ENDOPATH ETS45 2.5MM RELOADS (VASCULAR/THIN): Brand: ENDOPATH

## (undated) DEVICE — COVER SGL STRL LGHT HNDL BLU

## (undated) DEVICE — SOL  .9 1000ML BTL

## (undated) DEVICE — ENCORE® LATEX ACCLAIM SIZE 8, STERILE LATEX POWDER-FREE SURGICAL GLOVE: Brand: ENCORE

## (undated) DEVICE — CHLORAPREP 26ML APPLICATOR

## (undated) DEVICE — SUTURE VICRYL 0 UR-6

## (undated) DEVICE — UNDYED BRAIDED (POLYGLACTIN 910), SYNTHETIC ABSORBABLE SUTURE: Brand: COATED VICRYL

## (undated) DEVICE — ETS45 RELOAD STANDARD 45MM: Brand: ENDOPATH

## (undated) DEVICE — LAP CHOLE: Brand: MEDLINE INDUSTRIES, INC.

## (undated) NOTE — LETTER
[address], [place], [place]     AUTHORIZATION FOR SURGICAL OPERATION OR PROCEDURE    I hereby authorize Dr. Morteza Phillip MD, my Physician(s) and whomever may be designated as the doctor's Assistant, to perform the f 4. I consent to the photographing of procedure(s) to be performed for the purposes of advancing medicine, science and/or education, provided my identity is not revealed.  If the procedure has been videotaped, the physician/surgeon will obtain the original v (Witness signature)                                                                                                  (Date)                                (Time)  STATEMENT OF PHYSICIAN My signature below affirms that prior to the time of the procedure;  I

## (undated) NOTE — LETTER
[facility]  Administration of Anesthesia  1. Linda Ramirez, or _________________________________ acting on his behalf, (Patient) (Dependent/Representative) request to receive anesthesia for my pending procedure/operation/treatment.   A infections, high spinal block, spinal bleeding, seizure, cardiac arrest and death. 7. AWARENESS: I understand that it is possible (but unlikely) to have explicit memory of events from the operating room while under general anesthesia.   8. ELECTROCONVULSIV unconscious pt /Relationship    My signature below affirms that prior to the time of the procedure, I have explained to the patient and/or his/her guardian, the risks and benefits of undergoing anesthesia, as well as any reasonable alternatives.     _______

## (undated) NOTE — ED AVS SNAPSHOT
Efraín Foley   MRN: [id number]    Department:  [facility] Emergency Department   Date of Visit:  3/13/2020           Disclosure     Insurance plans vary and the physician(s) referred by the ER may not be covered by your plan.  Please contact y CARE PHYSICIAN AT ONCE OR RETURN IMMEDIATELY TO THE EMERGENCY DEPARTMENT. If you have been prescribed any medication(s), please fill your prescription right away and begin taking the medication(s) as directed.   If you believe that any of the medications